# Patient Record
(demographics unavailable — no encounter records)

---

## 2024-11-22 NOTE — PHYSICAL EXAM
[Slightly Antalgic] : slightly antalgic [LE] : Sensory: Intact in bilateral lower extremities [DP] : dorsalis pedis 2+ and symmetric bilaterally [PT] : posterior tibial 2+ and symmetric bilaterally [Normal] : Alert and in no acute distress [Poor Appearance] : well-appearing [Acute Distress] : not in acute distress [Obese] : not obese [de-identified] : The patient has no respiratory distress. Mood and affect are normal. The patient is alert and oriented to person, place and time. Examination of the lumbar spine demonstrates tenderness to the right of the midline. There is mild muscle spasm. There is no deformity. Lumbar flexion is 90, right lateral flexion 10 and left lateral flexion 10. Straight leg raise test is negative. Lower extremity neurologic exam is intact with regard to sensation, motor function and deep tendon reflexes. There is no pain with motion of the hips.  There is some stiffness of both hips.  There is no pain with knee motion.  Calves are soft and nontender.  The skin is intact.  There is no lymphedema. [de-identified] : AP and lateral x-rays of the lumbar spine taken today demonstrate no acute fracture or dislocation there are multilevel degenerative changes.  There are degenerative changes of the hip as well.

## 2024-11-22 NOTE — HISTORY OF PRESENT ILLNESS
[de-identified] : 87-year-old male presents for evaluation of low back pain that began yesterday. He was in the gym doing and exercise and fell on to his right side. He complains of intermittent sharp pain worse with rotation and lying down. Denies any radiating pain or numbness/tingling. He has tried heat with some relief. He has seen Dr. Whelan for the low back in the past, recommended PT which he did with good improvement.

## 2024-11-22 NOTE — DISCUSSION/SUMMARY
[de-identified] : The patient has sustained a sprain to his lumbar spine.  He has aggravated his lumbar spondylosis.  I have discussed the pathology, natural history and treatment options with him.  He is referred for physical therapy.  He will take Tylenol for pain.  He will be reevaluated in 1 month.  If he develops neurologic complaints he will be seen promptly.

## 2025-04-22 NOTE — HISTORY OF PRESENT ILLNESS
[Improving] : improving [de-identified] : 87 year old male presents for evaluation of lower back pain since Nov 2024.  Last seen in May 2023 for lower back pain. He notes that he was doing core exercises with his  and had tried to get up and felt the pain.  The pain is localized to the lower back. Denies numbness/tingling. Denies weakness. He uses a cream for the pain which helped. Heat also helps.  He has been doing acupuncture.  He has been participating with PT since November which helps.  He uses a lumbar corsette which helps.  He notes that his pain has improved, but is still persistent.  Has MRI Thoracic. No fever chills sweats nausea vomiting no bowel or bladder dysfunction, no recent weight loss or gain no night pain. This history is in addition to the intake form that I personally reviewed.

## 2025-04-22 NOTE — DISCUSSION/SUMMARY
[de-identified] : Bilateral hip arthritis R>L. Mild lumbar disc degenerative disease. T12 compression fracture. The fracture was more compressed compared to an x-ray from November 2022.  He has no percussion tenderness since most likely healed but we will make sure with an MRI. On blood thinners- no NSAIDs. Discussed all options. Referral for physical therapy. Lumbar MRI referral to assess fracture healing. F/U after MRI. All options discussed including rest, medicine, home exercise, acupuncture, Chiropractic care, Physical Therapy, Pain management, and last resort surgery. All questions were answered, all alternatives discussed, and the patient is in complete agreement with the treatment plan which the patient contributed to and discussed with me through the shared decision-making process. Follow-up appointment as instructed. Any issues and the patient will call or come in sooner.

## 2025-04-22 NOTE — DISCUSSION/SUMMARY
[de-identified] : Bilateral hip arthritis R>L. Mild lumbar disc degenerative disease. T12 compression fracture. The fracture was more compressed compared to an x-ray from November 2022.  He has no percussion tenderness since most likely healed but we will make sure with an MRI. On blood thinners- no NSAIDs. Discussed all options. Referral for physical therapy. Lumbar MRI referral to assess fracture healing. F/U after MRI. All options discussed including rest, medicine, home exercise, acupuncture, Chiropractic care, Physical Therapy, Pain management, and last resort surgery. All questions were answered, all alternatives discussed, and the patient is in complete agreement with the treatment plan which the patient contributed to and discussed with me through the shared decision-making process. Follow-up appointment as instructed. Any issues and the patient will call or come in sooner.

## 2025-04-22 NOTE — PHYSICAL EXAM
[Normal] : Gait: normal [Ho's Sign] : negative Ho's sign [Pronator Drift] : negative pronator drift [SLR] : negative straight leg raise [de-identified] : 5 out of 5 motor strength, sensation is intact and symmetrical full range of motion flexion extension and rotation, no palpatory tenderness full range of motion of hips knees shoulders and elbows (all four extremities), no atrophy, negative straight leg raise, no pathological reflexes, no swelling, normal ambulation, no apparent distress skin is intact, no palpable lymph nodes, no upper or lower extremity instability, alert and oriented x3 and normal mood. Normal finger-to nose test. No upper motor neuron findings.    [de-identified] : XR AP Lat Lumbar 04/22/2025 -bilateral hip arthritis R>L, Lumbar disc degenerative disease, T12 compression fracture- reviewed with the patient.   XR AP Lat Lumbar 11/22/2024 -bilateral hip arthritis R>L, Lumbar disc degenerative disease, T12 compression fracture- reviewed with patient.   I reviewed, interpreted and clinically correlated the following outside imaging studies, MRI Thoracic 1/8/25  ([facility])  IMPRESSION:    There is mild to moderate superior endplate height loss of T12 vertebral body with associated marrow edema signal. A linear T2 hyperintense/T1 hypointense focus involving superior aspect of vertebral body extending to the posterior cortex may represent fracture lucency. There is mild bony retropulsion of the posterior superior T12 vertebral body without significant canal stenosis.   Findings are suggestive of a recent burst/compression fracture given provided history of recent trauma. A CT of the thoracic spine can be obtained to further evaluate fracture lucencies.   No significant canal stenosis at the thoracic levels. Other chronic appearing findings as described in report.       FINDINGS AND RECOMMENDATION AS RELATED TO T12 VERTEBRAL BODY AS DESCRIBED IN REPORT AND IMPRESSION.   IMPORTANT FINDING. THIS REPORT WILL BE FLAGGED (!) IN EPIC.     CLINICAL INDICATION: T12-L1 bone edema. History of fall   TECHNIQUE: Multi-planar multi-sequential MR imaging of the thoracic spine was performed without the administration of intravenous contrast, according to standard protocol.    COMPARISON: Correlation is made to prior CT angiogram of the chest from 8/10/2021.   FINDINGS:   Motion artifact is present.   ALIGNMENT:  -Thoracic kyphosis is exaggerated.   VERTEBRAE:  -There is T1 hypointensity and STIR hyperintensity present in the T12 vertebral body with associated mild to moderate superior endplate height loss. -There is associated linear T2 hyperintensity that involves the anterior superior endplate, and likely extends to the posterior endplate (see for example sagittal series 6, image 10) with mild bony retropulsion of posterior superior vertebral body.   -There is rounded T2 hyperintensity present in posterior T11 vertebral body without evidence of T1 hypointensity. -Additional ovoid T2/STIR hyperintense foci in T5, T8, and T9 vertebral bodies with mild T1 hypointensity associated with the T5 focus. -Subtle lucency with internal striations seen in the region of T5 focus on prior CT angiogram chest, with features suggestive of intraosseous hemangioma.   -There is a background of mild heterogeneous marrow signal.   DISCS:  -Multilevel disc height loss and disc desiccation. -Multilevel degenerative endplate changes.   CORD:  -There are no focal cord signal abnormalities demonstrated within limitations of mild motion artifact. -The conus medullaris terminates at L1-2.    EVALUATION OF INDIVIDUAL LEVELS:    There is no significant canal stenosis at the thoracic levels.   PARAVERTEBRAL SOFT TISSUES:  -There is rounded T2 hyperintensity in right paratracheal region and similar location to previously seen ovoid nodule on prior CT angiogram chest.     XR AP Lat Lumbar 05/11/2023 -Bilateral hip arthritis R>L, mild lumbar disc degenerative disease- reviewed with patient.

## 2025-04-22 NOTE — PHYSICAL EXAM
[Normal] : Gait: normal [Ho's Sign] : negative Ho's sign [Pronator Drift] : negative pronator drift [SLR] : negative straight leg raise [de-identified] : 5 out of 5 motor strength, sensation is intact and symmetrical full range of motion flexion extension and rotation, no palpatory tenderness full range of motion of hips knees shoulders and elbows (all four extremities), no atrophy, negative straight leg raise, no pathological reflexes, no swelling, normal ambulation, no apparent distress skin is intact, no palpable lymph nodes, no upper or lower extremity instability, alert and oriented x3 and normal mood. Normal finger-to nose test. No upper motor neuron findings.    [de-identified] : XR AP Lat Lumbar 04/22/2025 -bilateral hip arthritis R>L, Lumbar disc degenerative disease, T12 compression fracture- reviewed with the patient.   XR AP Lat Lumbar 11/22/2024 -bilateral hip arthritis R>L, Lumbar disc degenerative disease, T12 compression fracture- reviewed with patient.   I reviewed, interpreted and clinically correlated the following outside imaging studies, MRI Thoracic 1/8/25  ([facility])  IMPRESSION:    There is mild to moderate superior endplate height loss of T12 vertebral body with associated marrow edema signal. A linear T2 hyperintense/T1 hypointense focus involving superior aspect of vertebral body extending to the posterior cortex may represent fracture lucency. There is mild bony retropulsion of the posterior superior T12 vertebral body without significant canal stenosis.   Findings are suggestive of a recent burst/compression fracture given provided history of recent trauma. A CT of the thoracic spine can be obtained to further evaluate fracture lucencies.   No significant canal stenosis at the thoracic levels. Other chronic appearing findings as described in report.       FINDINGS AND RECOMMENDATION AS RELATED TO T12 VERTEBRAL BODY AS DESCRIBED IN REPORT AND IMPRESSION.   IMPORTANT FINDING. THIS REPORT WILL BE FLAGGED (!) IN EPIC.     CLINICAL INDICATION: T12-L1 bone edema. History of fall   TECHNIQUE: Multi-planar multi-sequential MR imaging of the thoracic spine was performed without the administration of intravenous contrast, according to standard protocol.    COMPARISON: Correlation is made to prior CT angiogram of the chest from 8/10/2021.   FINDINGS:   Motion artifact is present.   ALIGNMENT:  -Thoracic kyphosis is exaggerated.   VERTEBRAE:  -There is T1 hypointensity and STIR hyperintensity present in the T12 vertebral body with associated mild to moderate superior endplate height loss. -There is associated linear T2 hyperintensity that involves the anterior superior endplate, and likely extends to the posterior endplate (see for example sagittal series 6, image 10) with mild bony retropulsion of posterior superior vertebral body.   -There is rounded T2 hyperintensity present in posterior T11 vertebral body without evidence of T1 hypointensity. -Additional ovoid T2/STIR hyperintense foci in T5, T8, and T9 vertebral bodies with mild T1 hypointensity associated with the T5 focus. -Subtle lucency with internal striations seen in the region of T5 focus on prior CT angiogram chest, with features suggestive of intraosseous hemangioma.   -There is a background of mild heterogeneous marrow signal.   DISCS:  -Multilevel disc height loss and disc desiccation. -Multilevel degenerative endplate changes.   CORD:  -There are no focal cord signal abnormalities demonstrated within limitations of mild motion artifact. -The conus medullaris terminates at L1-2.    EVALUATION OF INDIVIDUAL LEVELS:    There is no significant canal stenosis at the thoracic levels.   PARAVERTEBRAL SOFT TISSUES:  -There is rounded T2 hyperintensity in right paratracheal region and similar location to previously seen ovoid nodule on prior CT angiogram chest.     XR AP Lat Lumbar 05/11/2023 -Bilateral hip arthritis R>L, mild lumbar disc degenerative disease- reviewed with patient.

## 2025-04-22 NOTE — ADDENDUM
[FreeTextEntry1] : This note was written by Prabhakar Morejon on 04/22/2025 acting as scribe for Dr. Zachery Whelan M.D.  I, Zachery Whelan MD, have read and attest that all the information, medical decision making and discharge instructions within are true and accurate.

## 2025-04-22 NOTE — HISTORY OF PRESENT ILLNESS
[Improving] : improving [de-identified] : 87 year old male presents for evaluation of lower back pain since Nov 2024.  Last seen in May 2023 for lower back pain. He notes that he was doing core exercises with his  and had tried to get up and felt the pain.  The pain is localized to the lower back. Denies numbness/tingling. Denies weakness. He uses a cream for the pain which helped. Heat also helps.  He has been doing acupuncture.  He has been participating with PT since November which helps.  He uses a lumbar corsette which helps.  He notes that his pain has improved, but is still persistent.  Has MRI Thoracic. No fever chills sweats nausea vomiting no bowel or bladder dysfunction, no recent weight loss or gain no night pain. This history is in addition to the intake form that I personally reviewed.

## 2025-05-12 NOTE — DISCUSSION/SUMMARY
[de-identified] : Bilateral hip arthritis R>L. Mild lumbar disc degenerative disease. Lumbar stenosis. T12 compression fracture- fell 12/24. On blood thinners- no NSAIDs. Discussed all options. Referral for physical therapy. All options discussed including rest, medicine, home exercise, acupuncture, Chiropractic care, Physical Therapy, Pain management, and last resort surgery. All questions were answered, all alternatives discussed, and the patient is in complete agreement with the treatment plan which the patient contributed to and discussed with me through the shared decision-making process. Follow-up appointment as instructed. Any issues and the patient will call or come in sooner.

## 2025-05-12 NOTE — PHYSICAL EXAM
[Normal] : Gait: normal [Ho's Sign] : negative Ho's sign [Pronator Drift] : negative pronator drift [SLR] : negative straight leg raise [de-identified] : 5 out of 5 motor strength, sensation is intact and symmetrical full range of motion flexion extension and rotation, no palpatory tenderness full range of motion of hips knees shoulders and elbows (all four extremities), no atrophy, negative straight leg raise, no pathological reflexes, no swelling, normal ambulation, no apparent distress skin is intact, no palpable lymph nodes, no upper or lower extremity instability, alert and oriented x3 and normal mood. Normal finger-to nose test. No upper motor neuron findings.    [de-identified] : MRI Lumbar 5/5/25  ([facility])  IMPRESSION: Severe T12 compression fracture, demonstrating slightly increased loss of height compared to the 1/8/2025 MRI. The previously visualized marrow edema has now significantly decreased. Mild retropulsion along its superior aspect does not appear significantly changed. There is kyphotic angulation of the spine at T11/T12, worsened compared to the prior MRI.   Mild dextroscoliosis of the lumbar spine. Grade 1 anterolisthesis of L4 on L5 and L5 on S1.   Multilevel degenerative disc disease, facet arthropathy and ligamentum flavum hypertrophy, worst at L4/L5 where there are spinal canal and neuroforaminal stenosis, as described.     Clinical Indication: Pain, T12 fracture   Technique: Multi-planar, multi-sequential MR imaging of the lumbar spine was performed without intravenous contrast.    Comparison: MRI thoracic spine from 1/8/2025, CT chest from 5/10/2021   Findings:  For the purposes of this report, the vertebra on series 6 image 10 will be considered L1.   There is a severe T12 compression fracture, demonstrating slightly increased loss of height compared to the 1/8/2025 MRI. The previously visualized marrow edema has now significantly decreased. Mild retropulsion along its superior aspect does not appear significantly changed. There is kyphotic angulation of the spine at T11/T12, worsened compared to the prior MRI.   There is mild dextroscoliosis of the lumbar spine. There is normal lordotic curvature in the lumbar spine. Grade 1 anterolisthesis of L4 on L5 and L5 on S1 are visualized. Minimal loss of height in the L1 vertebral body appears chronic and stable. There are several small Schmorl's nodes in the lumbar spine. There are hemangiomas in the L2, L3 and L5 vertebral bodies, common benign findings.   There is intervertebral disc space narrowing throughout the lower thoracic and lumbar spine, worst at T11/T12, L1/L2 and L5/S1. There are minimal type II Modic changes at L5/S1.   The conus medullaris is at the level of L1.   T11-T12: There is retropulsion of the T12 compression fracture, slightly flattening the ventral thecal sac and cord. Facet arthropathy is also appreciated. There is no neuroforaminal doses.   T12-L1: A minimal disc bulge and mild bilateral facet arthropathy are appreciated. There is no spinal canal or neuroforaminal stenosis.   L1-L2: There is a disc bulge, as well as mild facet arthropathy and ligamentum flavum hypertrophy. There is no central canal, subarticular zone or neuroforaminal stenosis.   L2-L3: There is a disc bulge, as well as mild facet arthropathy and ligamentum flavum hypertrophy. There is no central canal, subarticular zone or neuroforaminal stenosis.   L3-L4: There is a disc bulge, as well as mild facet arthropathy and ligamentum flavum hypertrophy. There is no central canal, subarticular zone or neuroforaminal stenosis.   L4-L5: There is a disc bulge, as well as severe facet arthropathy and ligamentum flavum hypertrophy. Moderate central canal, moderate bilateral subarticular zone and moderate to severe bilateral neuroforaminal stenosis are appreciated.   L5-S1: A disc bulge, severe facet arthropathy and ligamentum flavum hypertrophy are appreciated. There is no central canal stenosis. There is minimal left subarticular zone stenosis. There is no neuroforaminal stenosis.    XR AP Lat Lumbar 04/22/2025 -bilateral hip arthritis R>L, Lumbar disc degenerative disease, T12 compression fracture- reviewed with the patient.   XR AP Lat Lumbar 11/22/2024 -bilateral hip arthritis R>L, Lumbar disc degenerative disease, T12 compression fracture- reviewed with patient.   I reviewed, interpreted and clinically correlated the following outside imaging studies, MRI Thoracic 1/8/25  ([facility])  IMPRESSION:    There is mild to moderate superior endplate height loss of T12 vertebral body with associated marrow edema signal. A linear T2 hyperintense/T1 hypointense focus involving superior aspect of vertebral body extending to the posterior cortex may represent fracture lucency. There is mild bony retropulsion of the posterior superior T12 vertebral body without significant canal stenosis.   Findings are suggestive of a recent burst/compression fracture given provided history of recent trauma. A CT of the thoracic spine can be obtained to further evaluate fracture lucencies.   No significant canal stenosis at the thoracic levels. Other chronic appearing findings as described in report.       FINDINGS AND RECOMMENDATION AS RELATED TO T12 VERTEBRAL BODY AS DESCRIBED IN REPORT AND IMPRESSION.   IMPORTANT FINDING. THIS REPORT WILL BE FLAGGED (!) IN EPIC.     CLINICAL INDICATION: T12-L1 bone edema. History of fall   TECHNIQUE: Multi-planar multi-sequential MR imaging of the thoracic spine was performed without the administration of intravenous contrast, according to standard protocol.    COMPARISON: Correlation is made to prior CT angiogram of the chest from 8/10/2021.   FINDINGS:   Motion artifact is present.   ALIGNMENT:  -Thoracic kyphosis is exaggerated.   VERTEBRAE:  -There is T1 hypointensity and STIR hyperintensity present in the T12 vertebral body with associated mild to moderate superior endplate height loss. -There is associated linear T2 hyperintensity that involves the anterior superior endplate, and likely extends to the posterior endplate (see for example sagittal series 6, image 10) with mild bony retropulsion of posterior superior vertebral body.   -There is rounded T2 hyperintensity present in posterior T11 vertebral body without evidence of T1 hypointensity. -Additional ovoid T2/STIR hyperintense foci in T5, T8, and T9 vertebral bodies with mild T1 hypointensity associated with the T5 focus. -Subtle lucency with internal striations seen in the region of T5 focus on prior CT angiogram chest, with features suggestive of intraosseous hemangioma.   -There is a background of mild heterogeneous marrow signal.   DISCS:  -Multilevel disc height loss and disc desiccation. -Multilevel degenerative endplate changes.   CORD:  -There are no focal cord signal abnormalities demonstrated within limitations of mild motion artifact. -The conus medullaris terminates at L1-2.    EVALUATION OF INDIVIDUAL LEVELS:    There is no significant canal stenosis at the thoracic levels.   PARAVERTEBRAL SOFT TISSUES:  -There is rounded T2 hyperintensity in right paratracheal region and similar location to previously seen ovoid nodule on prior CT angiogram chest.     XR AP Lat Lumbar 05/11/2023 -Bilateral hip arthritis R>L, mild lumbar disc degenerative disease- reviewed with patient.

## 2025-05-12 NOTE — HISTORY OF PRESENT ILLNESS
[Improving] : improving [de-identified] : 87 year old male presents for evaluation of lower back pain since Nov 2024.  He notes that he was doing core exercises with his  and had tried to get up and felt the pain.  The pain is localized to the lower back. Denies numbness/tingling. Denies weakness. Feeling better with PT. He uses a cream for the pain which helped. Heat also helps.  He has been doing acupuncture.  He has been participating with PT since November which helps.  He uses a lumbar corsette which helps.  He notes that his pain has improved, but is still persistent.  Has MRI Thoracic. Presents today for MRI Lumbar review.  No fever chills sweats nausea vomiting no bowel or bladder dysfunction, no recent weight loss or gain no night pain. This history is in addition to the intake form that I personally reviewed.

## 2025-05-12 NOTE — ADDENDUM
[FreeTextEntry1] : This note was written by Prabhakar Morejon on 05/12/2025 acting as scribe for Dr. Zachery Whelan M.D.  I, Zachery Whelan MD, have read and attest that all the information, medical decision making and discharge instructions within are true and accurate.

## 2025-07-22 NOTE — PHYSICAL EXAM
[de-identified] : Well developed, well nourished in no apparent distress, awake, alert and orientated to person, place and time. with appropriate mood and affect.  Respirations are even and unlabored. Gait evaluation does reveal a limp. There is no inguinal adenopathy.  The affected limb is well-perfused, without skin lesions, shows a grossly normal motor and sensory examination.  Knee motion does cause pain. ROM of the knee is  degrees.  5 degrees varus The knee is stable within that range-of-motion to AP and ML stress.  Muscle strength is normal. Pedal pulses are palpable.  [de-identified] : Long standing  knee, AP knee, lateral knee, tunnel and patellar views of the left knee were ordered and taken in the office and demonstrate degenerative joint disease of the knee with joint space narrowing, osteophyte formation, and subchondral sclerosis.

## 2025-07-22 NOTE — DISCUSSION/SUMMARY
[de-identified] : The patient has left knee osteoarthritis. An extensive discussion was conducted on the natural history of the disease and the variety of surgical and non-surgical options available to the patient including, but not limited to non-steroidal anti-inflammatory medications, steroid injections, physical therapy, maintenance of ideal body weight, and reduction of activity. I recommended a prescription of Mobic but the patient would prefer to use OTC NSAIDs at this time. PT rx. Today we performed a left knee intraarticular csi. The patient will schedule an appointment as needed.  Informed consent for the left knee injection was obtained. All risks, benefits and alternatives were discussed. These included but were not limited to bleeding, infection, and allergic reaction.  All questions were answered. A time out was performed. The left knee was prepped and draped in sterile fashion. Using sterile technique, the left knee was injected with 80mg of Kenalog, 4cc of 1% lidocaine, 4cc of 0.25% marcaine using a 21-gauge needle. A sterile dressing was applied. Post injection instructions were reviewed. The patient tolerated the procedure well.

## 2025-07-22 NOTE — HISTORY OF PRESENT ILLNESS
[de-identified] : This is a very nice  88 year old  male experiencing  pain in the left knee which is severe in intensity and has been going on for at least 1 year now.  Has known left knee osteoarthritis. Treatment thus far has included steroid injections as well as PRP (8 years ago).  The pain limits activities of daily living. Walking tolerance is  reduced. The patient denies any radiation of the pain to the feet and it is not associated with numbness, tingling, or weakness.